# Patient Record
Sex: FEMALE | Race: WHITE
[De-identification: names, ages, dates, MRNs, and addresses within clinical notes are randomized per-mention and may not be internally consistent; named-entity substitution may affect disease eponyms.]

---

## 2020-07-26 ENCOUNTER — HOSPITAL ENCOUNTER (INPATIENT)
Dept: HOSPITAL 92 - L&D/OP | Age: 30
LOS: 2 days | Discharge: HOME | End: 2020-07-28
Attending: OBSTETRICS & GYNECOLOGY | Admitting: OBSTETRICS & GYNECOLOGY
Payer: COMMERCIAL

## 2020-07-26 VITALS — BODY MASS INDEX: 27.7 KG/M2

## 2020-07-26 DIAGNOSIS — Z3A.39: ICD-10-CM

## 2020-07-26 DIAGNOSIS — Z11.59: ICD-10-CM

## 2020-07-26 LAB
HBSAG INDEX: 0.15 S/CO (ref 0–0.99)
HGB BLD-MCNC: 12.4 G/DL (ref 12–16)
MCH RBC QN AUTO: 29.5 PG (ref 27–31)
MCV RBC AUTO: 84.6 FL (ref 78–98)
PLATELET # BLD AUTO: 194 THOU/UL (ref 130–400)
RBC # BLD AUTO: 4.19 MILL/UL (ref 4.2–5.4)
SYPHILIS ANTIBODY INDEX: 0.04 S/CO
WBC # BLD AUTO: 11.1 THOU/UL (ref 4.8–10.8)

## 2020-07-26 PROCEDURE — 87340 HEPATITIS B SURFACE AG IA: CPT

## 2020-07-26 PROCEDURE — 51702 INSERT TEMP BLADDER CATH: CPT

## 2020-07-26 PROCEDURE — 99285 EMERGENCY DEPT VISIT HI MDM: CPT

## 2020-07-26 PROCEDURE — 86780 TREPONEMA PALLIDUM: CPT

## 2020-07-26 PROCEDURE — U0003 INFECTIOUS AGENT DETECTION BY NUCLEIC ACID (DNA OR RNA); SEVERE ACUTE RESPIRATORY SYNDROME CORONAVIRUS 2 (SARS-COV-2) (CORONAVIRUS DISEASE [COVID-19]), AMPLIFIED PROBE TECHNIQUE, MAKING USE OF HIGH THROUGHPUT TECHNOLOGIES AS DESCRIBED BY CMS-2020-01-R: HCPCS

## 2020-07-26 PROCEDURE — 36415 COLL VENOUS BLD VENIPUNCTURE: CPT

## 2020-07-26 PROCEDURE — 87635 SARS-COV-2 COVID-19 AMP PRB: CPT

## 2020-07-26 PROCEDURE — 85027 COMPLETE CBC AUTOMATED: CPT

## 2020-07-26 RX ADMIN — Medication PRN MLS: at 21:32

## 2020-07-26 RX ADMIN — Medication PRN MLS: at 23:31

## 2020-07-26 NOTE — PDOC.FPROB
FMR OB H&P: HPI





- History of Present Illness


Chief Complaint: LOF


Indentification: 31yo  at 39.0wks by LMP c/w 1T US


History of Present Illness: 


31yo  at 39.0wks presents for clear LOF at 1330. Endorses FM. Denies 

vaginal bleeding. Reports multiple right sided rib fractures 6wks ago but pain 

is much improved. Reports starting to have mild contractions. 








Primary Care Physician: 


Dr Ji





FMR OB H&P: Current Pregnancy





- Prenatal Care


: 3


Para: 0020


Gestational age: 39.0wks


Due date: 2020


Dating Criteria: LMP c/w 1T US


Course/Complications: 


Rib fxs 6wks ago





- OB Labs


Blood type: A


RH: positive


Antibody Screen: negative


HIV: negative


RPR: negative


HepBsAg: negative


Rubella: immune


Quad screen: negative


Urine drug screen: negative


Gonorrhea: negative


Chlamydia: negative


Pap Smear: 2017


1 hour gtt: 105


GBS: negative


H&H: 11.6/32.7


Platelets: 259





FMR OB H&P: History





- Past Medical History


PMH: 


Denies





- OB History


OB History: 


2 spontaneous abortions 





- Surgical History


Sx History: 


Resection of fibroadenoma





- Social History


Social History: 


Denies alcohol, drug and tobacco use





FMR OB H&P: Medications





- Current


Home Medications: 


 











 Medication  Instructions  Recorded  Confirmed  Type


 


Pnv No.95/Ferrous Fum/Folic AC 1 tablet PO DAILY 20 History





[Prenatal Tablet]    











Allergies/Adverse Reactions: 


 Allergies











Allergy/AdvReac Type Severity Reaction Status Date / Time


 


No Known Allergies Allergy   Verified 20 14:57














FMR OB H&P: ROS





- Review of Systems


General: denies: fever/chills, fatigue


Eyes: denies: vision changes


ENT: denies: nasal congestion, rhinorrhea


Cardiovascular: denies: chest pain, edema


Respiratory: denies: cough, congestion, shortness of breath


Gastrointestinal: denies: abdominal pain, nausea, vomiting


Genitourinary (Female): reports: contractions.  denies: dysuria, vaginal 

bleeding


Musculoskeletal: reports: other (Right rib pain)


Neurologic: denies: weakness, headache


Integumentary: denies: rash, lesions





FMR OB H&P: Vital Signs





- Maternal


Vital signs: 


BP: 123/68


HR: 76





- Fetal Heart Tones


Baseline: 130


Variability: moderate


Acceleration: present


Deceleration: absent


Category: category 1


Elbert contractions every: Irregular





FMR OB H&P: Physical Exam





- Physical Exam


General: NAD, awake, alert and oriented


HEENT: normocephalic and atraumatic, no scleral icterus, grossly normal hearing


Neck: supple


Heart: RRR, no murmurs/rubs/gallops


General: CTAB, no respiratory distress


Abdomen: soft, gravid, non-tender


Musculoskeletal: pulses present, no misalignment/asymmetry, no atrophy


Neurological: no focal deficit


Skin: no rash


Lymphatic: no unusual bruising or bleeding


Psychiatric: intact recent and remote memory, good judgement and insight, 

normal mood and affect





- Pelvic Exam


Vulva: no lesions, no discharge


SVE: /0


Membranes: SROM- continuous clear leakage of fluid


Fetal Presentation: cephalic by US





FMR OB H&P: A/P


Disposition: 


31yo  at 39.0wks by LMP c/w 1T US presents with SROM





sIUP with SROM


- Clear leakage of fluid on exam. SVE /0 unchanged from last clinic exam


- FHTs Cat 1


- Starting to contract, discussed expectant management vs pitocin. Pt would 

like to try expectant management at this time


- Screening COVID swab collected, asymptomatic


- Desires epidural prior to delivery


- Admit to L&D 

















Discussion: 


Date/Time: 20 4076











This H&P was discussed with Dr. Guerrero who agrees with the above documentation 

and plan.

## 2020-07-26 NOTE — PDOC.EVN
Event Note





- Event Note


Event Note: 





sve 3/90/0 at 1840. no change since admission. pitocin started fetus cat 1 TRA

## 2020-07-27 LAB
HGB BLD-MCNC: 10.8 G/DL (ref 12–16)
MCH RBC QN AUTO: 28.1 PG (ref 27–31)
MCV RBC AUTO: 85.4 FL (ref 78–98)
PLATELET # BLD AUTO: 154 THOU/UL (ref 130–400)
RBC # BLD AUTO: 3.83 MILL/UL (ref 4.2–5.4)
WBC # BLD AUTO: 13.9 THOU/UL (ref 4.8–10.8)

## 2020-07-27 PROCEDURE — 0KQM0ZZ REPAIR PERINEUM MUSCLE, OPEN APPROACH: ICD-10-PCS | Performed by: OBSTETRICS & GYNECOLOGY

## 2020-07-27 RX ADMIN — DOCUSATE CALCIUM SCH MG: 240 CAPSULE, LIQUID FILLED ORAL at 20:31

## 2020-07-27 RX ADMIN — BENZOCAINE AND LEVOMENTHOL PRN CAN: 200; 5 SPRAY TOPICAL at 02:51

## 2020-07-27 RX ADMIN — DOCUSATE CALCIUM SCH MG: 240 CAPSULE, LIQUID FILLED ORAL at 08:01

## 2020-07-27 NOTE — PRG
DATE OF SERVICE:  07/27/2020



SUBJECTIVE:  The patient is postpartum day 1, status post a term spontaneous vaginal

delivery.  She reports she is tolerating p.o., voiding on her own, having decreased

lochia and good pain control. 



OBJECTIVE:  VITAL SIGNS:  This morning, blood pressure is 114/63, temperature 98.6,

pulse is 76, respiratory rate of 12. 

GENERAL:  She appears to be in no acute distress.  She is alert and oriented,

cooperative, and pleasant to interact with. 

HEAD:  Normocephalic, atraumatic. 

ABDOMEN:  Fundus is firm at the umbilicus, -2. 

EXTREMITIES:  Nontender with symmetrical minimal edema.



LABORATORY DATA:  Postpartum hemoglobin is 10.8, hematocrit 32.7, platelets 154,000.



ASSESSMENT AND PLAN:  The patient is postpartum day 1, status post a term

spontaneous vaginal delivery.  Anticipate discharge tomorrow. 







Job ID:  [id number]

## 2020-07-27 NOTE — DN
DATE OF PROCEDURE:  07/26/2020



The patient delivered a male infant on 07/26/2020 at 2121 hours by an uncomplicated

term spontaneous vaginal delivery at 39 weeks gestation.  Birth weight is 2919 g.

Apgars are 9 and 9.  Placenta delivered spontaneously followed by Pitocin infusion.

There was a small second-degree laceration repaired in the usual fashion.

Quantitative blood loss was 125 mL.  Dr. Guerrero is the delivering physician.

Counts were correct.  Mother and baby were stable in the room and immediate

postpartum. 







Job ID:  [id number]

## 2020-07-28 VITALS — TEMPERATURE: 98.1 F | SYSTOLIC BLOOD PRESSURE: 131 MMHG | DIASTOLIC BLOOD PRESSURE: 80 MMHG

## 2020-07-28 RX ADMIN — BENZOCAINE AND LEVOMENTHOL PRN CAN: 200; 5 SPRAY TOPICAL at 06:00

## 2020-07-28 RX ADMIN — DOCUSATE CALCIUM SCH MG: 240 CAPSULE, LIQUID FILLED ORAL at 08:05

## 2020-07-28 NOTE — PDOC.PP
Post Partum Progress Note


Post Partum Day #: PP2


Subjective: 


Resting comfortably.


No c/o.


Ready for home.


PO intake tolerated: yes


Flatus: yes


Ambulation: yes


 Weight











Weight                         80.286 kg

















- Physical Examination


General: NAD


Respiratory: non-labored breathing


Neurological: no gross focal deficits


Psychiatric: normal affect


Result Diagrams: 


 20 07:09





Additional Labs: 


 Post Partum Labs











Hep Bs Antigen  Non-Reactive S/CO (NonReactive)   20  15:19    














- Assessment/Plan


Doing well s/p .


DC home with precautions.


RTC 6 weeks with Dr. Ji.